# Patient Record
Sex: FEMALE | Race: WHITE | NOT HISPANIC OR LATINO | URBAN - METROPOLITAN AREA
[De-identification: names, ages, dates, MRNs, and addresses within clinical notes are randomized per-mention and may not be internally consistent; named-entity substitution may affect disease eponyms.]

---

## 2018-01-04 ENCOUNTER — EMERGENCY (EMERGENCY)
Facility: HOSPITAL | Age: 48
LOS: 1 days | Discharge: HOME | End: 2018-01-04
Attending: EMERGENCY MEDICINE | Admitting: EMERGENCY MEDICINE
Payer: COMMERCIAL

## 2018-01-04 VITALS
HEART RATE: 73 BPM | TEMPERATURE: 98 F | DIASTOLIC BLOOD PRESSURE: 85 MMHG | RESPIRATION RATE: 18 BRPM | WEIGHT: 134.92 LBS | SYSTOLIC BLOOD PRESSURE: 131 MMHG | OXYGEN SATURATION: 98 %

## 2018-01-04 DIAGNOSIS — Z79.2 LONG TERM (CURRENT) USE OF ANTIBIOTICS: ICD-10-CM

## 2018-01-04 DIAGNOSIS — H72.92 UNSPECIFIED PERFORATION OF TYMPANIC MEMBRANE, LEFT EAR: ICD-10-CM

## 2018-01-04 DIAGNOSIS — H92.02 OTALGIA, LEFT EAR: ICD-10-CM

## 2018-01-04 DIAGNOSIS — Z79.899 OTHER LONG TERM (CURRENT) DRUG THERAPY: ICD-10-CM

## 2018-01-04 PROCEDURE — 99283 EMERGENCY DEPT VISIT LOW MDM: CPT | Mod: 25

## 2018-01-04 RX ORDER — TRAMADOL HYDROCHLORIDE 50 MG/1
2 TABLET ORAL
Qty: 15 | Refills: 0 | OUTPATIENT
Start: 2018-01-04

## 2018-01-04 RX ORDER — TRAMADOL HYDROCHLORIDE 50 MG/1
50 TABLET ORAL ONCE
Qty: 0 | Refills: 0 | Status: DISCONTINUED | OUTPATIENT
Start: 2018-01-04 | End: 2018-01-04

## 2018-01-04 RX ORDER — PSEUDOEPHEDRINE HCL 30 MG
1 TABLET ORAL
Qty: 10 | Refills: 0 | OUTPATIENT
Start: 2018-01-04

## 2018-01-04 RX ADMIN — TRAMADOL HYDROCHLORIDE 50 MILLIGRAM(S): 50 TABLET ORAL at 04:46

## 2018-01-04 RX ADMIN — Medication 1 TABLET(S): at 04:46

## 2018-01-04 NOTE — ED PROVIDER NOTE - MEDICAL DECISION MAKING DETAILS
+ otitis media with TM perforation. Rx Augmentin, Sudafed and Tramadol. F/U with ENT in 24 hours. Strict return precautions reviewed with pt in which pt verbalizes understanding and agrees to.

## 2018-01-04 NOTE — ED PROVIDER NOTE - ATTENDING CONTRIBUTION TO CARE
Pt w L ear pain in the setting of recent URI symptoms. Hx of R sided TM performation (F/U w ENT). On exam TM is erythematous and bulging, some blood noted in external canal. Suspicios for OM  w perforation. Coevered w po abxs, recommend motrin for pain, decongestant, F/U w ENT

## 2018-01-04 NOTE — ED PROVIDER NOTE - OBJECTIVE STATEMENT
46 y/o F p/w left ear pain x 3-4 hours. Pain is moderate to severe in nature and is associated with tinnitus and muffled hearing. She took ibuprofen at home without relief. Pt reports having a recent URI (nasal congestion and cough) over the past week but states her ears have been fine up until tonight. She has a hx of TM perforation on the right side.    Denies fever, chills, headache, dizziness, epistaxis, sore throat, hemoptysis, CP, SOB

## 2021-08-13 NOTE — ED PROVIDER NOTE - TEMPLATE, MLM
How Severe Are Your Spot(S)?: mild What Type Of Note Output Would You Prefer (Optional)?: Standard Output What Is The Reason For Today's Visit?: Full Body Skin Examination What Is The Reason For Today's Visit? (Being Monitored For X): concerning skin lesions on an annual basis General

## 2024-10-17 NOTE — ED PROVIDER NOTE - CONSTITUTIONAL, MLM
coarse/Anterior:/Lateral: normal... Well appearing, well nourished, awake, alert, oriented to person, place, time/situation and in no apparent distress.